# Patient Record
Sex: FEMALE | HISPANIC OR LATINO | Employment: OTHER | URBAN - METROPOLITAN AREA
[De-identification: names, ages, dates, MRNs, and addresses within clinical notes are randomized per-mention and may not be internally consistent; named-entity substitution may affect disease eponyms.]

---

## 2017-11-01 ENCOUNTER — OFFICE VISIT (OUTPATIENT)
Dept: OBSTETRICS AND GYNECOLOGY | Facility: CLINIC | Age: 67
End: 2017-11-01

## 2017-11-01 VITALS
BODY MASS INDEX: 31.82 KG/M2 | HEIGHT: 65 IN | DIASTOLIC BLOOD PRESSURE: 75 MMHG | HEART RATE: 92 BPM | WEIGHT: 191 LBS | SYSTOLIC BLOOD PRESSURE: 123 MMHG

## 2017-11-01 DIAGNOSIS — E11.9 CONTROLLED TYPE 2 DIABETES MELLITUS WITHOUT COMPLICATION, WITHOUT LONG-TERM CURRENT USE OF INSULIN (HCC): ICD-10-CM

## 2017-11-01 DIAGNOSIS — Z11.51 SCREENING FOR HPV (HUMAN PAPILLOMAVIRUS): ICD-10-CM

## 2017-11-01 DIAGNOSIS — Z12.4 SCREENING FOR CERVICAL CANCER: ICD-10-CM

## 2017-11-01 DIAGNOSIS — Z12.11 SCREENING FOR COLON CANCER: ICD-10-CM

## 2017-11-01 DIAGNOSIS — Z01.419 ENCOUNTER FOR GYNECOLOGICAL EXAMINATION WITHOUT ABNORMAL FINDING: Primary | ICD-10-CM

## 2017-11-01 PROCEDURE — G0101 CA SCREEN;PELVIC/BREAST EXAM: HCPCS | Performed by: OBSTETRICS & GYNECOLOGY

## 2017-11-01 RX ORDER — ATORVASTATIN CALCIUM 10 MG/1
10 TABLET, FILM COATED ORAL NIGHTLY
COMMUNITY
End: 2021-07-07

## 2017-11-01 RX ORDER — CETIRIZINE HYDROCHLORIDE 10 MG/1
10 TABLET ORAL DAILY
COMMUNITY

## 2017-11-01 RX ORDER — GLIPIZIDE 10 MG/1
10 TABLET ORAL
COMMUNITY

## 2017-11-01 RX ORDER — AMLODIPINE BESYLATE 10 MG/1
10 TABLET ORAL DAILY
COMMUNITY
End: 2021-07-07

## 2017-11-01 RX ORDER — LOSARTAN POTASSIUM 50 MG/1
50 TABLET ORAL DAILY
COMMUNITY
End: 2021-07-07

## 2017-11-01 RX ORDER — LANCETS 30 GAUGE
EACH MISCELLANEOUS
COMMUNITY
End: 2021-07-07

## 2017-11-01 RX ORDER — IBUPROFEN 800 MG/1
800 TABLET ORAL EVERY 6 HOURS PRN
COMMUNITY
End: 2021-07-07

## 2017-11-01 RX ORDER — SULFASALAZINE 500 MG/1
500 TABLET ORAL 4 TIMES DAILY
COMMUNITY
End: 2021-07-07

## 2017-11-01 NOTE — PROGRESS NOTES
GYN Annual Exam     CC- Here for annual exam.     Molly Marino is a 67 y.o. female who presents for annual well woman exam. Periods are absent due to Menopause.     OB History      Para Term  AB Living    2 2    2    SAB TAB Ectopic Multiple Live Births                  Current contraception: post menopausal status  History of abnormal Pap smear: no  Family history of uterine, colon or ovarian cancer: no  History of abnormal mammogram: no  Family history of breast cancer: no  Last Pap : 3 yrs ago  Last mammo: 2016  Last Dexa: 1-2 yrs ago  Last Colonoscopy: never     Past Medical History:   Diagnosis Date   • Diabetes mellitus    • Hyperlipidemia    • Hypertension    • Osteoarthritis    • Seasonal allergies        History reviewed. No pertinent surgical history.      Current Outpatient Prescriptions:   •  amLODIPine (NORVASC) 10 MG tablet, Take 10 mg by mouth Daily., Disp: , Rfl:   •  atorvastatin (LIPITOR) 10 MG tablet, Take 10 mg by mouth Every Night., Disp: , Rfl:   •  cetirizine (zyrTEC) 10 MG tablet, Take 10 mg by mouth Daily., Disp: , Rfl:   •  glipiZIDE (GLUCOTROL) 10 MG tablet, Take 10 mg by mouth 2 (Two) Times a Day Before Meals., Disp: , Rfl:   •  glucose blood test strip, 1 each by Other route As Needed. Use as instructed, Disp: , Rfl:   •  ibuprofen (ADVIL,MOTRIN) 800 MG tablet, Take 800 mg by mouth Every 6 (Six) Hours As Needed for Mild Pain ., Disp: , Rfl:   •  Lancets misc, , Disp: , Rfl:   •  losartan (COZAAR) 50 MG tablet, Take 50 mg by mouth Daily., Disp: , Rfl:   •  metFORMIN (GLUCOPHAGE) 500 MG tablet, Take 500 mg by mouth 2 (Two) Times a Day With Meals., Disp: , Rfl:   •  sulfaSALAzine (AZULFIDINE) 500 MG tablet, Take 500 mg by mouth 4 (Four) Times a Day., Disp: , Rfl:     No Known Allergies    Social History   Substance Use Topics   • Smoking status: Never Smoker   • Smokeless tobacco: Never Used   • Alcohol use No       Family History   Problem Relation Age of  "Onset   • Breast cancer Neg Hx    • Ovarian cancer Neg Hx    • Uterine cancer Neg Hx    • Colon cancer Neg Hx    • Deep vein thrombosis Neg Hx    • Pulmonary embolism Neg Hx        Review of Systems   Constitutional: Negative for chills and fever.   Gastrointestinal: Negative for abdominal pain.   Genitourinary: Negative for dysuria, pelvic pain, vaginal bleeding and vaginal discharge.   All other systems reviewed and are negative.      /75  Pulse 92  Ht 65\" (165.1 cm)  Wt 191 lb (86.6 kg)  Breastfeeding? No  BMI 31.78 kg/m2    Physical Exam   Constitutional: She is oriented to person, place, and time. She appears well-developed and well-nourished. No distress.   HENT:   Head: Normocephalic and atraumatic.   Eyes: Conjunctivae are normal.   Neck: Normal range of motion. Neck supple. No thyromegaly present.   Cardiovascular: Normal rate and regular rhythm.    No murmur heard.  Pulmonary/Chest: Effort normal and breath sounds normal. Right breast exhibits no inverted nipple, no mass and no nipple discharge. Left breast exhibits no inverted nipple, no mass and no nipple discharge.   Abdominal: Soft. Bowel sounds are normal. She exhibits no distension. There is no tenderness.   Genitourinary: Rectum normal, vagina normal and uterus normal. Rectal exam shows no mass and anal tone normal. Pelvic exam was performed with patient supine. There is no lesion on the right labia. There is no lesion on the left labia. Uterus is not enlarged, not fixed and not tender. Cervix exhibits no motion tenderness. Right adnexum displays no mass and no tenderness. Left adnexum displays no mass and no tenderness. No bleeding in the vagina. No vaginal discharge found.   Musculoskeletal: She exhibits no edema.   Lymphadenopathy:        Right: No inguinal adenopathy present.        Left: No inguinal adenopathy present.   Neurological: She is alert and oriented to person, place, and time.   Skin: No rash noted.   Psychiatric: She has " a normal mood and affect. Her behavior is normal.          Assessment     1) GYN annual well woman exam.   2) DM, controlled on medication follow up with PCP      Plan     1) Breast Health - Clinical breast exam & mammogram yearly, Self breast awareness monthly  2) Pap - updated today   3) Smoking status- non-smoker   4) Colon health - screening colonoscopy recommended   5) Bone health - Weight bearing exercise, dietary calcium recommendations and vitamin D reviewed.   6) Seat belts recommended  7) Follow up prn and two years      Misha Mcneil MD   11/1/2017  3:33 PM

## 2017-11-03 LAB
CYTOLOGIST CVX/VAG CYTO: NORMAL
CYTOLOGY CVX/VAG DOC THIN PREP: NORMAL
DX ICD CODE: NORMAL
DX ICD CODE: NORMAL
HIV 1 & 2 AB SER-IMP: NORMAL
HPV I/H RISK 1 DNA CVX QL PROBE+SIG AMP: NEGATIVE
OTHER STN SPEC: NORMAL
PATH REPORT.FINAL DX SPEC: NORMAL
STAT OF ADQ CVX/VAG CYTO-IMP: NORMAL

## 2017-11-28 ENCOUNTER — PREP FOR SURGERY (OUTPATIENT)
Dept: OTHER | Facility: HOSPITAL | Age: 67
End: 2017-11-28

## 2017-11-28 DIAGNOSIS — R19.4 CHANGE IN BOWEL HABITS: Primary | ICD-10-CM

## 2017-12-27 ENCOUNTER — OUTSIDE FACILITY SERVICE (OUTPATIENT)
Dept: GASTROENTEROLOGY | Facility: CLINIC | Age: 67
End: 2017-12-27

## 2017-12-27 PROCEDURE — G0121 COLON CA SCRN NOT HI RSK IND: HCPCS | Performed by: INTERNAL MEDICINE

## 2018-01-05 ENCOUNTER — TELEPHONE (OUTPATIENT)
Dept: GASTROENTEROLOGY | Facility: CLINIC | Age: 68
End: 2018-01-05

## 2018-01-05 NOTE — TELEPHONE ENCOUNTER
----- Message from José Aguilera MD sent at 1/5/2018  9:49 AM EST -----  Regarding: FW: Procedure  Colonoscopy recall 10 years        ----- Message -----     From: Tj Johnson     Sent: 1/3/2018   2:37 PM       To: José Aguilera MD  Subject: Procedure                                        Scanned in    Repeat colonoscopy placed on recall list.

## 2018-02-23 ENCOUNTER — PROCEDURE VISIT (OUTPATIENT)
Dept: OBSTETRICS AND GYNECOLOGY | Facility: CLINIC | Age: 68
End: 2018-02-23

## 2018-02-23 ENCOUNTER — APPOINTMENT (OUTPATIENT)
Dept: WOMENS IMAGING | Facility: HOSPITAL | Age: 68
End: 2018-02-23

## 2018-02-23 DIAGNOSIS — Z12.31 VISIT FOR SCREENING MAMMOGRAM: Primary | ICD-10-CM

## 2018-02-23 PROCEDURE — 77067 SCR MAMMO BI INCL CAD: CPT | Performed by: OBSTETRICS & GYNECOLOGY

## 2018-02-23 PROCEDURE — 77067 SCR MAMMO BI INCL CAD: CPT | Performed by: RADIOLOGY

## 2021-07-07 ENCOUNTER — OFFICE VISIT (OUTPATIENT)
Dept: OBSTETRICS AND GYNECOLOGY | Facility: CLINIC | Age: 71
End: 2021-07-07

## 2021-07-07 VITALS
HEIGHT: 65 IN | WEIGHT: 181 LBS | HEART RATE: 79 BPM | DIASTOLIC BLOOD PRESSURE: 66 MMHG | SYSTOLIC BLOOD PRESSURE: 113 MMHG | BODY MASS INDEX: 30.16 KG/M2

## 2021-07-07 DIAGNOSIS — N39.498 OTHER URINARY INCONTINENCE: ICD-10-CM

## 2021-07-07 DIAGNOSIS — N81.3 UTEROVAGINAL PROLAPSE, COMPLETE: ICD-10-CM

## 2021-07-07 DIAGNOSIS — Z01.411 ENCOUNTER FOR GYNECOLOGICAL EXAMINATION (GENERAL) (ROUTINE) WITH ABNORMAL FINDINGS: ICD-10-CM

## 2021-07-07 DIAGNOSIS — N95.0 PMB (POSTMENOPAUSAL BLEEDING): Primary | ICD-10-CM

## 2021-07-07 PROCEDURE — 99203 OFFICE O/P NEW LOW 30 MIN: CPT | Performed by: OBSTETRICS & GYNECOLOGY

## 2021-07-07 PROCEDURE — A4561 PESSARY RUBBER, ANY TYPE: HCPCS | Performed by: OBSTETRICS & GYNECOLOGY

## 2021-07-07 PROCEDURE — 57160 INSERT PESSARY/OTHER DEVICE: CPT | Performed by: OBSTETRICS & GYNECOLOGY

## 2021-07-07 PROCEDURE — 58100 BIOPSY OF UTERUS LINING: CPT | Performed by: OBSTETRICS & GYNECOLOGY

## 2021-07-07 RX ORDER — ATORVASTATIN CALCIUM 20 MG/1
TABLET, FILM COATED ORAL
COMMUNITY
Start: 2021-04-11

## 2021-07-07 RX ORDER — HYDROCHLOROTHIAZIDE 12.5 MG/1
12.5 CAPSULE, GELATIN COATED ORAL DAILY
COMMUNITY
Start: 2021-02-02 | End: 2021-08-01

## 2021-07-07 RX ORDER — AMLODIPINE BESYLATE 5 MG/1
TABLET ORAL
COMMUNITY
Start: 2021-04-11

## 2021-07-07 RX ORDER — FERROUS SULFATE 325(65) MG
1 TABLET ORAL 2 TIMES DAILY
COMMUNITY
Start: 2021-04-06

## 2021-07-07 RX ORDER — PSEUDOEPHED/ACETAMINOPH/DIPHEN 30MG-500MG
TABLET ORAL
COMMUNITY
Start: 2021-06-23

## 2021-07-07 RX ORDER — ALENDRONATE SODIUM 70 MG/1
TABLET ORAL
COMMUNITY
Start: 2021-07-01

## 2021-07-07 RX ORDER — FAMOTIDINE 20 MG/1
TABLET, FILM COATED ORAL
COMMUNITY
Start: 2021-04-11

## 2021-07-07 RX ORDER — LOSARTAN POTASSIUM 100 MG/1
TABLET ORAL
COMMUNITY
Start: 2021-04-11

## 2021-07-07 RX ORDER — METOPROLOL SUCCINATE 25 MG/1
TABLET, EXTENDED RELEASE ORAL
COMMUNITY
Start: 2021-04-11

## 2021-07-07 NOTE — PROGRESS NOTES
"Cc - vaginal bleeding     HPI     71 y.o.    Spotting started a few months ago - no pain   3 weeks ago started with heavier bleeding and associated pain   3 pads per day. No clots   Pain is lower abdominal - midline pain   Worse with movement.   Intermittent in nature.   Alleviated with tylenol   She has lost weight - average of about 2 # per week last few weeks.   Testing with PCP for UTI was negative       Past Medical History:   Diagnosis Date   • Diabetes mellitus (CMS/HCC)    • Hyperlipidemia    • Hypertension    • Osteoarthritis    • Osteopenia    • Seasonal allergies      No past surgical history on file.  Family History   Problem Relation Age of Onset   • Breast cancer Daughter    • Ovarian cancer Neg Hx    • Uterine cancer Neg Hx    • Colon cancer Neg Hx    • Deep vein thrombosis Neg Hx    • Pulmonary embolism Neg Hx      Social History     Tobacco Use   • Smoking status: Never Smoker   • Smokeless tobacco: Never Used   Substance Use Topics   • Alcohol use: No   • Drug use: No     meds   HCTZ  Fosamax  Norvasc  Liptor   Pepcid  Glucotrol  Cozaar   glucophage   toprol XL     Allergies:  Patient has no known allergies.    Review of Systems   Constitutional: Positive for fatigue and unexpected weight change. Negative for fever.   Gastrointestinal: Positive for abdominal pain and constipation. Negative for diarrhea.   Genitourinary: Positive for pelvic pain and vaginal bleeding. Negative for dysuria and hematuria.   All other systems reviewed and are negative.      /66   Pulse 79   Ht 165.1 cm (65\")   Wt 82.1 kg (181 lb)   Breastfeeding No   BMI 30.12 kg/m²     Physical Exam  Constitutional:       General: She is not in acute distress.     Appearance: She is well-developed and normal weight.   Genitourinary:      Pelvic exam was performed with patient supine.      Vulva, urethra, bladder, vagina, right adnexa, left adnexa and rectum normal.      No vulval lesion or Bartholin's cyst noted.      No " posterior fourchette lesion present.      No inguinal adenopathy present in the right or left side.     Vaginal atrophy and prolapse (complete inversion of the vagina present ) present.      No vaginal discharge or bleeding.      No cervical motion tenderness or friability.      Uterus is not enlarged or tender.      No uterine mass detected.     Uterus is anteverted and regular.      Right and left adnexa are non-palpable.   Rectum:      No rectal mass or abnormal anal tone.   HENT:      Head: Normocephalic and atraumatic.   Eyes:      Conjunctiva/sclera: Conjunctivae normal.      Pupils: Pupils are equal, round, and reactive to light.   Neck:      Thyroid: No thyromegaly.   Cardiovascular:      Rate and Rhythm: Normal rate and regular rhythm.      Heart sounds: Normal heart sounds. No murmur heard.     Pulmonary:      Effort: Pulmonary effort is normal. No respiratory distress.      Breath sounds: Normal breath sounds.   Chest:      Breasts:         Right: No inverted nipple, mass or nipple discharge.         Left: No inverted nipple, mass or nipple discharge.   Abdominal:      General: Abdomen is flat. There is no distension.      Palpations: Abdomen is soft.      Tenderness: There is no abdominal tenderness.   Musculoskeletal:         General: No deformity. Normal range of motion.      Cervical back: Normal range of motion and neck supple.   Lymphadenopathy:      Lower Body: No right inguinal adenopathy. No left inguinal adenopathy.   Neurological:      Mental Status: She is alert and oriented to person, place, and time.   Skin:     General: Skin is warm and dry.      Findings: No erythema.   Psychiatric:         Behavior: Behavior normal.   Vitals reviewed. Exam conducted with a chaperone present.        Endometrial Biopsy    Date/Time: 7/7/2021 1:29 PM  Performed by: Misha Mcneil MD  Authorized by: Misha Mcneil MD   Preparation: Patient was prepped and draped in the usual sterile  fashion.  Local anesthesia used: no    Anesthesia:  Local anesthesia used: no    Sedation:  Patient sedated: no    Patient tolerance: patient tolerated the procedure well with no immediate complications  Comments: Verbal consent obtained. Cervix was outside vaginal opening. Swabbed with betadine x 2. Pipelle passed to 11 cm with return of small amount of tissue. Sent for pathology. Tolerated well. No complications.             Assessment     Diagnoses and all orders for this visit:    1. PMB (postmenopausal bleeding) (Primary)  -     Reference Histopathology  -     US Non-ob Transvaginal  -     IGP, Rfx Aptima HPV ASCU    2. Uterovaginal prolapse, complete    3. Encounter for gynecological examination (general) (routine) with abnormal findings   -     IGP, Rfx Aptima HPV ASCU    4. Other urinary incontinence    Other orders  -     Endometrial Biopsy      1) PMB - new onset with spotting then heavier bleeding. Discussed concern for hyperplasia and cancer. Work up with GYN ultrasound as well as endometrial biopsy. On exam concerned this is from vaginal irritation and not a result of endometrial pathology but will confirm.     2) Complete uterovaginal prolapse.   New finding   Reviewed refer to urogyn vs pessary and wants to proceed with pessary   Discussed that a pessary was a long term vaginal device that acted to support the uterus, cervix, bladder and keep those from prolapsing through her vaginal opening. That it would recur if she removed the device immediately and that we like to see these patients back every 3 months or so for a pessary cleaning and evaluation of the vagina to ensure it is not causing any damage or other concerns.     Ring with support #5 placed  Tolerated well   Will order for long term management.         3) pap done with bleeding to rule out cancer.   Per medicare only code available to use as GYN exam     4) Urinary incontinence   See if better post pessary - could still need urogyn      Misha Mcneil MD  7/7/2021  13:28 EDT

## 2021-07-09 LAB
DX ICD CODE: NORMAL
PATH REPORT.FINAL DX SPEC: NORMAL
PATH REPORT.GROSS SPEC: NORMAL
PATH REPORT.RELEVANT HX SPEC: NORMAL
PATH REPORT.SITE OF ORIGIN SPEC: NORMAL
PATHOLOGIST NAME: NORMAL
PAYMENT PROCEDURE: NORMAL

## 2021-07-12 ENCOUNTER — TELEPHONE (OUTPATIENT)
Dept: OBSTETRICS AND GYNECOLOGY | Facility: CLINIC | Age: 71
End: 2021-07-12

## 2021-07-12 DIAGNOSIS — N81.3 UTEROVAGINAL PROLAPSE, COMPLETE: Primary | ICD-10-CM

## 2021-07-12 NOTE — TELEPHONE ENCOUNTER
ELDON-sharing with you.  I am working on the referral.  Let me know if you want me to call pt to come in today & what time.    Thanks,   Allie

## 2021-07-12 NOTE — TELEPHONE ENCOUNTER
Good Morning,   Patients daughter calling, she said that pessary fell out, and she was trying to check the status of the permanent device.  I dont see any notes, please advise

## 2021-07-12 NOTE — TELEPHONE ENCOUNTER
Patients daughter called back, she said her mother called her, because her device fell out, she has been in severe pain all weekend and unable to move or walk.  Would like to proceed with surgery.

## 2021-07-12 NOTE — TELEPHONE ENCOUNTER
Allie,     Check with Kylee- see if she can upsize her pessary by 1, I think that would be ring with support size #6, she can come in and we place slightly larger device while they wait. Going to need something why she gets in and undergoes treatment with Uro Gyn. FYI - new referral placed for this. I would suggest Dr. Alva or Marie     Check with kylee- okay if can come today to replace to get her out of pain. Bring in the one that fell out.     Thanks,   Dr. Mcneil

## 2021-07-13 LAB
CONV .: NORMAL
CYTOLOGIST CVX/VAG CYTO: NORMAL
CYTOLOGY CVX/VAG DOC CYTO: NORMAL
CYTOLOGY CVX/VAG DOC THIN PREP: NORMAL
DX ICD CODE: NORMAL
HIV 1 & 2 AB SER-IMP: NORMAL
OTHER STN SPEC: NORMAL
PATHOLOGIST CVX/VAG CYTO: NORMAL
STAT OF ADQ CVX/VAG CYTO-IMP: NORMAL

## 2021-07-13 NOTE — TELEPHONE ENCOUNTER
TONIA, pt scheduled for pessary maintenance/change tomorrow at [facility].  Wasn't sure if you have what you need here-incase you need to bring supplies.     Pt # [phone]      Referral and records faxed to [facility] for review.  They will call pt to schedule with Artie or Aiden.      [facility] # [phone]             Fax # [phone]

## 2021-07-14 ENCOUNTER — TELEPHONE (OUTPATIENT)
Dept: OBSTETRICS AND GYNECOLOGY | Facility: CLINIC | Age: 71
End: 2021-07-14

## 2021-07-14 ENCOUNTER — OFFICE VISIT (OUTPATIENT)
Dept: OBSTETRICS AND GYNECOLOGY | Facility: CLINIC | Age: 71
End: 2021-07-14

## 2021-07-14 VITALS
HEART RATE: 81 BPM | BODY MASS INDEX: 30.16 KG/M2 | DIASTOLIC BLOOD PRESSURE: 69 MMHG | WEIGHT: 181 LBS | SYSTOLIC BLOOD PRESSURE: 130 MMHG | HEIGHT: 65 IN

## 2021-07-14 DIAGNOSIS — N81.3 UTEROVAGINAL PROLAPSE, COMPLETE: Primary | ICD-10-CM

## 2021-07-14 DIAGNOSIS — N95.0 PMB (POSTMENOPAUSAL BLEEDING): ICD-10-CM

## 2021-07-14 PROCEDURE — 99213 OFFICE O/P EST LOW 20 MIN: CPT | Performed by: OBSTETRICS & GYNECOLOGY

## 2021-07-14 PROCEDURE — A4561 PESSARY RUBBER, ANY TYPE: HCPCS | Performed by: OBSTETRICS & GYNECOLOGY

## 2021-07-14 NOTE — TELEPHONE ENCOUNTER
----- Message from Kylee Avila MA sent at 7/12/2021  4:28 PM EDT -----  L/m for pt/brian  ----- Message -----  From: Misha Mcneil MD  Sent: 7/9/2021   1:15 PM EDT  To: NARINDER Wilson, her endometrial biopsy is benign and ultrasound is unremarkable. Please let them know. Finally find out if okay to just do pessary or wants to consider referral for uro gyn to surgically fix her prolapse. Thanks, Dr. Mcneil

## 2021-07-14 NOTE — PROGRESS NOTES
"Subjective    is a 71 y.o. female here for pessary sizing. Pessary ring with support size 5 fell out. She would like to try a size while waits for an appointment to see UroGyn.    Chief Complaint   Patient presents with   • Follow-up        HPI     71 y.o.   Seen last week with new onset PMB  Found to have complete uterine prolapse.   Pessary with support #5 placed  Came out one day later.   Called and wanted to see about something larger while awaits consult with uro gyn.   Referral placed in Epic on 7/12/21 when called about above.         Review of Systems   Gastrointestinal: Negative for abdominal pain.   Genitourinary: Positive for pelvic pain. Negative for vaginal bleeding.        Objective   /69   Pulse 81   Ht 165.1 cm (65\")   Wt 82.1 kg (181 lb)   BMI 30.12 kg/m²   Physical Exam  Constitutional:       General: She is not in acute distress.     Appearance: Normal appearance.   Genitourinary:      Vulva normal.      No vulval lesion or Bartholin's cyst noted.      Vaginal prolapse (cervix and vagina completely prolapsed through introitus ) present.   Abdominal:      General: Abdomen is flat. There is no distension.      Palpations: Abdomen is soft.      Tenderness: There is no abdominal tenderness.   Neurological:      Mental Status: She is alert.          Assessment/Plan   Diagnoses and all orders for this visit:    1. Uterovaginal prolapse, complete (Primary)    2. PMB (postmenopausal bleeding)    1) Uterovaginal prolapse  Used ring with support #6 to see if would work better for her. While she awaits evaluation and treatment with urogyn     The ring with support #6 was used from our personal supply of devices. So this was given to patient to use during this interval.     2) PMB  All testing is normal. EMBx, ultrasound and pap   So reassured this is related to above.     Misha Mcneil MD   7/14/2021  15:49 EDT  "

## 2021-07-18 ENCOUNTER — DOCUMENTATION (OUTPATIENT)
Dept: OBSTETRICS AND GYNECOLOGY | Facility: CLINIC | Age: 71
End: 2021-07-18

## 2021-07-18 NOTE — PROGRESS NOTES
Patient’s daughter Caridad is calling in wanting to get her mother’s surgery for uterine prolapse moved up because the new pessary has again fallen out and her mother has severe abdominal pain when she has prolapse. I discussed that we can try to get her referral to be more urgent tomorrow but if her pain is worsening she should go to the ED. I encouraged the patient to restrict movement so that her prolapse symptoms lessen. Her daughter indicated understanding and all questions answered.    Shawnee Palomino MD

## 2021-07-19 ENCOUNTER — TELEPHONE (OUTPATIENT)
Dept: OBSTETRICS AND GYNECOLOGY | Facility: CLINIC | Age: 71
End: 2021-07-19

## 2021-07-19 NOTE — TELEPHONE ENCOUNTER
----- Message from Shawnee Palomino MD sent at 7/19/2021 10:51 AM EDT -----  Regarding: Urgent referral  Alise Kelley,    Could you make this referral to urogyn urgently? The patient is very symptomatic and would like to get in as fast as possible. I discussed this with Tarun.     Thanks,    Samantha

## 2021-07-19 NOTE — TELEPHONE ENCOUNTER
"L/m with referral dept at  [facility] to f/u on referral that was faxed to their office on 7/13/2021. I re-faxed our referral with an \"URGENT REQUEST\".  I asked if I could go ahead and schedule for the patient, but they would not let me do that without being sure the records and referral have been reviewed.  I am still waiting for a return call.     [facility] [phone]    Pt # [phone]  "

## 2021-07-19 NOTE — TELEPHONE ENCOUNTER
L/m, via , for pt to call regarding referral to Urogyn.    Informed pt's daughter, Lesli on HIPAA, pt is scheduled at  [facility] w/Dr. Roel Alva this Wednesday, 7/21/21 at 8:00am (arrival time).  Lesli may accompany pt to appt.

## 2023-08-08 ENCOUNTER — OFFICE VISIT (OUTPATIENT)
Dept: OBSTETRICS AND GYNECOLOGY | Facility: CLINIC | Age: 73
End: 2023-08-08
Payer: MEDICARE

## 2023-08-08 ENCOUNTER — PROCEDURE VISIT (OUTPATIENT)
Dept: OBSTETRICS AND GYNECOLOGY | Facility: CLINIC | Age: 73
End: 2023-08-08
Payer: MEDICARE

## 2023-08-08 VITALS
SYSTOLIC BLOOD PRESSURE: 118 MMHG | HEART RATE: 71 BPM | DIASTOLIC BLOOD PRESSURE: 71 MMHG | WEIGHT: 175 LBS | HEIGHT: 65 IN | BODY MASS INDEX: 29.16 KG/M2

## 2023-08-08 DIAGNOSIS — Z01.419 ENCOUNTER FOR GYNECOLOGICAL EXAMINATION WITHOUT ABNORMAL FINDING: Primary | ICD-10-CM

## 2023-08-08 DIAGNOSIS — Z12.31 VISIT FOR SCREENING MAMMOGRAM: Primary | ICD-10-CM

## 2023-08-08 RX ORDER — ATORVASTATIN CALCIUM 10 MG/1
TABLET, FILM COATED ORAL
COMMUNITY
Start: 2023-07-19

## 2023-08-08 RX ORDER — AMLODIPINE BESYLATE 5 MG/1
1 TABLET ORAL DAILY
COMMUNITY
Start: 2023-07-19

## 2023-08-08 RX ORDER — ALENDRONATE SODIUM 70 MG/1
1 TABLET ORAL
COMMUNITY
Start: 2023-07-19

## 2023-08-08 RX ORDER — METOPROLOL SUCCINATE 25 MG/1
1 TABLET, EXTENDED RELEASE ORAL NIGHTLY
COMMUNITY
Start: 2023-07-19

## 2023-08-08 RX ORDER — GLIPIZIDE 5 MG/1
TABLET, FILM COATED, EXTENDED RELEASE ORAL
COMMUNITY
Start: 2023-07-19

## 2023-08-08 RX ORDER — LOSARTAN POTASSIUM AND HYDROCHLOROTHIAZIDE 12.5; 1 MG/1; MG/1
1 TABLET ORAL EVERY MORNING
COMMUNITY
Start: 2023-07-19

## 2023-08-08 RX ORDER — FAMOTIDINE 20 MG/1
1 TABLET, FILM COATED ORAL NIGHTLY
COMMUNITY
Start: 2023-07-19

## 2023-08-08 NOTE — PROGRESS NOTES
GYN Annual Exam     CC- Here for annual exam.     Molly Marino is a 73 y.o. female who presents for annual well woman exam. Periods are  absent due to Hysterectomy.     OB History          2    Para   2    Term                AB        Living   2         SAB        IAB        Ectopic        Molar        Multiple        Live Births                    Current contraception: Hysterectomy.   History of abnormal Pap smear: no  Family history of uterine, colon or ovarian cancer: no  History of abnormal mammogram: no  Family history of breast cancer: yes - Daughter   Last Pap : 2021 NIL   Last mammogram: today  Last colonoscopy:   Last DEXA: 2022 Osteopenia- taking Fosamax (primary managing the osteopenia) Recent stable DEXA in    Parental Hip Fracture: No     Past Medical History:   Diagnosis Date    Diabetes mellitus     Hyperlipidemia     Hypertension     Osteoarthritis     Osteopenia     Seasonal allergies        Past Surgical History:   Procedure Laterality Date    SACROCOLPOPEXY      VAGINAL HYSTERECTOMY           Current Outpatient Medications:     alendronate (FOSAMAX) 70 MG tablet, Take 1 tablet by mouth Every 7 (Seven) Days., Disp: , Rfl:     amLODIPine (NORVASC) 5 MG tablet, Take 1 tablet by mouth Daily., Disp: , Rfl:     atorvastatin (LIPITOR) 10 MG tablet, TAKE 1 TABLET ONE TIME DAILY IN THE EVENING, Disp: , Rfl:     famotidine (PEPCID) 20 MG tablet, Take 1 tablet by mouth Every Night., Disp: , Rfl:     glipizide (GLUCOTROL XL) 5 MG ER tablet, TAKE 1 TABLET ONE TIME DAILY IN THE MORNING. DO NOT CRUSH, SPLIT OR CHEW, Disp: , Rfl:     losartan-hydrochlorothiazide (HYZAAR) 100-12.5 MG per tablet, Take 1 tablet by mouth Every Morning., Disp: , Rfl:     metFORMIN (GLUCOPHAGE) 1000 MG tablet, Take 1 tablet by mouth 2 (Two) Times a Day., Disp: , Rfl:     metoprolol succinate XL (TOPROL-XL) 25 MG 24 hr tablet, Take 1 tablet by mouth Every Night., Disp: , Rfl:      "Acetaminophen Extra Strength 500 MG tablet, TAKE 2 CAPLETS BY MOUTH EVERY 6 HOURS AS NEEDED FOR MILD PAIN., Disp: , Rfl:     Calcium 600+D High Potency 600-400 MG-UNIT per tablet, Take 1 tablet by mouth 2 (Two) Times a Day., Disp: , Rfl:     cetirizine (zyrTEC) 10 MG tablet, Take 10 mg by mouth Daily., Disp: , Rfl:     FeroSul 325 (65 Fe) MG tablet, Take 1 tablet by mouth 2 (Two) Times a Day., Disp: , Rfl:     No Known Allergies    Social History     Tobacco Use    Smoking status: Never    Smokeless tobacco: Never   Substance Use Topics    Alcohol use: No    Drug use: No       Family History   Problem Relation Age of Onset    Breast cancer Daughter     Ovarian cancer Neg Hx     Uterine cancer Neg Hx     Colon cancer Neg Hx     Deep vein thrombosis Neg Hx     Pulmonary embolism Neg Hx        Review of Systems   Constitutional:  Negative for chills and fever.   Gastrointestinal:  Negative for abdominal pain, constipation and diarrhea.   Genitourinary:  Negative for pelvic pain, vaginal bleeding and vaginal discharge.   All other systems reviewed and are negative.    /71   Pulse 71   Ht 165.1 cm (65\")   Wt 79.4 kg (175 lb)   BMI 29.12 kg/mý     Physical Exam  Constitutional:       General: She is not in acute distress.     Appearance: She is well-developed. She is obese.   Genitourinary:      Vulva, bladder, rectum and urethral meatus normal.      Right Labia: No lesions or Bartholin's cyst.     Left Labia: No lesions or Bartholin's cyst.     No inguinal adenopathy present in the right or left side.     Vaginal cuff intact.     No vaginal discharge, bleeding or cuff induration.      No vaginal prolapse present.     Mild vaginal atrophy present.       Right Adnexa: not tender, not full and no mass present.     Left Adnexa: not tender, not full and no mass present.     Cervix is absent.      No parametrium thickening present.     Uterus is absent.   Rectum:      No rectal mass or abnormal anal tone. "   Breasts:     Right: No inverted nipple, mass or nipple discharge.      Left: No inverted nipple, mass or nipple discharge.   HENT:      Head: Normocephalic and atraumatic.   Eyes:      Conjunctiva/sclera: Conjunctivae normal.      Pupils: Pupils are equal, round, and reactive to light.   Neck:      Thyroid: No thyromegaly.   Cardiovascular:      Rate and Rhythm: Normal rate and regular rhythm.      Heart sounds: Normal heart sounds. No murmur heard.  Pulmonary:      Effort: Pulmonary effort is normal. No respiratory distress.      Breath sounds: Normal breath sounds.   Abdominal:      General: Abdomen is flat. There is no distension.      Palpations: Abdomen is soft.      Tenderness: There is no abdominal tenderness.   Musculoskeletal:         General: No deformity. Normal range of motion.      Cervical back: Normal range of motion and neck supple.   Lymphadenopathy:      Lower Body: No right inguinal adenopathy. No left inguinal adenopathy.   Neurological:      Mental Status: She is alert and oriented to person, place, and time.   Skin:     General: Skin is warm and dry.      Findings: No erythema.   Psychiatric:         Behavior: Behavior normal.   Vitals reviewed. Exam conducted with a chaperone present.           Assessment     Diagnoses and all orders for this visit:    1. GYN annual  (Primary)  -     IGP, Rfx Aptima HPV ASCU         Plan     1) Breast Health - Clinical breast exam & mammogram reviewed specifically [employer] recommendations for screening specific to her, and Self breast awareness monthly  Keeping up with yearly MMG  2) Pap - updated today   3) Smoking status- non-smoker   4) Colon health - screening colonoscopy up to date  5) Bone health - Weight bearing exercise, dietary calcium recommendations and vitamin D reviewed.   PCP following, on treatment - stable   6) Encouraged between 7-8 hours of good sleep per night.   7) Follow up prn and one year      Misha Mcneil MD    8/8/2023  10:56 EDT

## 2023-08-12 LAB
CONV .: NORMAL
CYTOLOGIST CVX/VAG CYTO: NORMAL
CYTOLOGY CVX/VAG DOC CYTO: NORMAL
CYTOLOGY CVX/VAG DOC THIN PREP: NORMAL
DX ICD CODE: NORMAL
HIV 1 & 2 AB SER-IMP: NORMAL
OTHER STN SPEC: NORMAL
STAT OF ADQ CVX/VAG CYTO-IMP: NORMAL

## 2024-08-20 ENCOUNTER — PROCEDURE VISIT (OUTPATIENT)
Dept: OBSTETRICS AND GYNECOLOGY | Facility: CLINIC | Age: 74
End: 2024-08-20
Payer: MEDICARE

## 2024-08-20 DIAGNOSIS — Z12.31 VISIT FOR SCREENING MAMMOGRAM: Primary | ICD-10-CM

## 2024-08-20 PROCEDURE — 77063 BREAST TOMOSYNTHESIS BI: CPT | Performed by: OBSTETRICS & GYNECOLOGY

## 2024-08-20 PROCEDURE — 77067 SCR MAMMO BI INCL CAD: CPT | Performed by: OBSTETRICS & GYNECOLOGY

## 2025-04-07 ENCOUNTER — APPOINTMENT (OUTPATIENT)
Dept: CT IMAGING | Facility: HOSPITAL | Age: 75
End: 2025-04-07
Payer: MEDICARE

## 2025-04-07 ENCOUNTER — HOSPITAL ENCOUNTER (EMERGENCY)
Facility: HOSPITAL | Age: 75
Discharge: HOME | End: 2025-04-08
Attending: STUDENT IN AN ORGANIZED HEALTH CARE EDUCATION/TRAINING PROGRAM | Admitting: STUDENT IN AN ORGANIZED HEALTH CARE EDUCATION/TRAINING PROGRAM
Payer: MEDICARE

## 2025-04-07 DIAGNOSIS — H60.391 ACUTE INFECTIVE OTITIS EXTERNA, RIGHT: ICD-10-CM

## 2025-04-07 DIAGNOSIS — H66.3X1 CHRONIC SUPPURATIVE OTITIS MEDIA OF RIGHT EAR, UNSPECIFIED OTITIS MEDIA LOCATION: Primary | ICD-10-CM

## 2025-04-07 LAB
ALBUMIN SERPL-MCNC: 4.6 G/DL (ref 3.5–5.2)
ALBUMIN/GLOB SERPL: 1.4 G/DL
ALP SERPL-CCNC: 107 U/L (ref 39–117)
ALT SERPL W P-5'-P-CCNC: 14 U/L (ref 1–33)
ANION GAP SERPL CALCULATED.3IONS-SCNC: 10.7 MMOL/L (ref 5–15)
AST SERPL-CCNC: 15 U/L (ref 1–32)
BASOPHILS # BLD AUTO: 0.03 10*3/MM3 (ref 0–0.2)
BASOPHILS NFR BLD AUTO: 0.4 % (ref 0–1.5)
BILIRUB SERPL-MCNC: 0.2 MG/DL (ref 0–1.2)
BUN SERPL-MCNC: 23 MG/DL (ref 8–23)
BUN/CREAT SERPL: 29.1 (ref 7–25)
CALCIUM SPEC-SCNC: 9.8 MG/DL (ref 8.6–10.5)
CHLORIDE SERPL-SCNC: 104 MMOL/L (ref 98–107)
CO2 SERPL-SCNC: 24.3 MMOL/L (ref 22–29)
CREAT SERPL-MCNC: 0.79 MG/DL (ref 0.57–1)
DEPRECATED RDW RBC AUTO: 43.4 FL (ref 37–54)
EGFRCR SERPLBLD CKD-EPI 2021: 78.6 ML/MIN/1.73
EOSINOPHIL # BLD AUTO: 0.07 10*3/MM3 (ref 0–0.4)
EOSINOPHIL NFR BLD AUTO: 1 % (ref 0.3–6.2)
ERYTHROCYTE [DISTWIDTH] IN BLOOD BY AUTOMATED COUNT: 13.1 % (ref 12.3–15.4)
GLOBULIN UR ELPH-MCNC: 3.3 GM/DL
GLUCOSE SERPL-MCNC: 142 MG/DL (ref 65–99)
HCT VFR BLD AUTO: 41.3 % (ref 34–46.6)
HGB BLD-MCNC: 12.9 G/DL (ref 12–15.9)
IMM GRANULOCYTES # BLD AUTO: 0 10*3/MM3 (ref 0–0.05)
IMM GRANULOCYTES NFR BLD AUTO: 0 % (ref 0–0.5)
LYMPHOCYTES # BLD AUTO: 2.11 10*3/MM3 (ref 0.7–3.1)
LYMPHOCYTES NFR BLD AUTO: 29.9 % (ref 19.6–45.3)
MCH RBC QN AUTO: 27.9 PG (ref 26.6–33)
MCHC RBC AUTO-ENTMCNC: 31.2 G/DL (ref 31.5–35.7)
MCV RBC AUTO: 89.4 FL (ref 79–97)
MONOCYTES # BLD AUTO: 0.42 10*3/MM3 (ref 0.1–0.9)
MONOCYTES NFR BLD AUTO: 5.9 % (ref 5–12)
NEUTROPHILS NFR BLD AUTO: 4.43 10*3/MM3 (ref 1.7–7)
NEUTROPHILS NFR BLD AUTO: 62.8 % (ref 42.7–76)
PLATELET # BLD AUTO: 294 10*3/MM3 (ref 140–450)
PMV BLD AUTO: 10.1 FL (ref 6–12)
POTASSIUM SERPL-SCNC: 3.9 MMOL/L (ref 3.5–5.2)
PROT SERPL-MCNC: 7.9 G/DL (ref 6–8.5)
RBC # BLD AUTO: 4.62 10*6/MM3 (ref 3.77–5.28)
SODIUM SERPL-SCNC: 139 MMOL/L (ref 136–145)
WBC NRBC COR # BLD AUTO: 7.06 10*3/MM3 (ref 3.4–10.8)

## 2025-04-07 PROCEDURE — 70450 CT HEAD/BRAIN W/O DYE: CPT

## 2025-04-07 PROCEDURE — 85652 RBC SED RATE AUTOMATED: CPT | Performed by: STUDENT IN AN ORGANIZED HEALTH CARE EDUCATION/TRAINING PROGRAM

## 2025-04-07 PROCEDURE — 99285 EMERGENCY DEPT VISIT HI MDM: CPT

## 2025-04-07 PROCEDURE — 25010000002 MORPHINE PER 10 MG: Performed by: STUDENT IN AN ORGANIZED HEALTH CARE EDUCATION/TRAINING PROGRAM

## 2025-04-07 PROCEDURE — 80053 COMPREHEN METABOLIC PANEL: CPT | Performed by: STUDENT IN AN ORGANIZED HEALTH CARE EDUCATION/TRAINING PROGRAM

## 2025-04-07 PROCEDURE — 70487 CT MAXILLOFACIAL W/DYE: CPT

## 2025-04-07 PROCEDURE — 96374 THER/PROPH/DIAG INJ IV PUSH: CPT

## 2025-04-07 PROCEDURE — 85025 COMPLETE CBC W/AUTO DIFF WBC: CPT | Performed by: STUDENT IN AN ORGANIZED HEALTH CARE EDUCATION/TRAINING PROGRAM

## 2025-04-07 RX ORDER — MORPHINE SULFATE 2 MG/ML
2 INJECTION, SOLUTION INTRAMUSCULAR; INTRAVENOUS ONCE
Status: COMPLETED | OUTPATIENT
Start: 2025-04-07 | End: 2025-04-07

## 2025-04-07 RX ADMIN — MORPHINE SULFATE 2 MG: 2 INJECTION, SOLUTION INTRAMUSCULAR; INTRAVENOUS at 23:08

## 2025-04-08 VITALS
HEART RATE: 74 BPM | HEIGHT: 62 IN | TEMPERATURE: 97.4 F | WEIGHT: 169 LBS | RESPIRATION RATE: 18 BRPM | DIASTOLIC BLOOD PRESSURE: 71 MMHG | BODY MASS INDEX: 31.1 KG/M2 | OXYGEN SATURATION: 100 % | SYSTOLIC BLOOD PRESSURE: 133 MMHG

## 2025-04-08 LAB — ERYTHROCYTE [SEDIMENTATION RATE] IN BLOOD: 44 MM/HR (ref 0–30)

## 2025-04-08 PROCEDURE — 25510000001 IOPAMIDOL PER 1 ML: Performed by: STUDENT IN AN ORGANIZED HEALTH CARE EDUCATION/TRAINING PROGRAM

## 2025-04-08 RX ORDER — IOPAMIDOL 755 MG/ML
100 INJECTION, SOLUTION INTRAVASCULAR
Status: COMPLETED | OUTPATIENT
Start: 2025-04-08 | End: 2025-04-08

## 2025-04-08 RX ORDER — OXYCODONE HYDROCHLORIDE 5 MG/1
5 TABLET ORAL ONCE
Refills: 0 | Status: COMPLETED | OUTPATIENT
Start: 2025-04-08 | End: 2025-04-08

## 2025-04-08 RX ORDER — CIPROFLOXACIN AND DEXAMETHASONE 3; 1 MG/ML; MG/ML
4 SUSPENSION/ DROPS AURICULAR (OTIC) 2 TIMES DAILY
Qty: 7.5 ML | Refills: 0 | Status: SHIPPED | OUTPATIENT
Start: 2025-04-08 | End: 2025-04-15

## 2025-04-08 RX ORDER — AMOXICILLIN 500 MG/1
500 CAPSULE ORAL 3 TIMES DAILY
Qty: 21 CAPSULE | Refills: 0 | Status: SHIPPED | OUTPATIENT
Start: 2025-04-08 | End: 2025-04-15

## 2025-04-08 RX ORDER — NAPROXEN SODIUM 550 MG/1
550 TABLET ORAL 2 TIMES DAILY WITH MEALS
Qty: 20 TABLET | Refills: 0 | Status: SHIPPED | OUTPATIENT
Start: 2025-04-08 | End: 2025-04-18

## 2025-04-08 RX ADMIN — IOPAMIDOL 85 ML: 755 INJECTION, SOLUTION INTRAVENOUS at 00:09

## 2025-04-08 RX ADMIN — OXYCODONE HYDROCHLORIDE 5 MG: 5 TABLET ORAL at 02:06

## 2025-04-08 NOTE — [facility] PROVIDER NOTE
Subjective   History of Present Illness  History obtained via patient w/ bilingual daughter at bedside    Outside records reviewed include ENT documentation from NP Jaelyn Antoine on 1/20's 11/2025.  Patient was therefore persistent right otorrhea and otalgia.  Chronic asymmetric hearing loss.  Patient has multiple MRIs of IAC    MDM/HPI:  75-year-old female presents emergency department for evaluation of right-sided ear pain.  Has been ongoing chronically for several months now.  Patient follows with ENT as documented above.  However, over the past week, her symptoms have become worse.  She is a dull aching sensation of the right ear that radiates to the surrounding tissues.  Often causes her to experience a headache.  It fluctuates.  Has noticed some drainage coming out of the ear as well.    Gen: NAD  HEENT: left ear canal clear. Right ear w/ yellow otorrhea and ear canal erythema. Post ear tender. PERRLA, EOMI, 2040 vision bilaterally  CV: RRR, S1S2, No murmur, No edema  Pulm: CTAB, Unlabored respirations  Abd: Soft, NT/ND  MSK: No deformities  Neuro: A+Ox3, GCS 15, Moving all extremities spontaneously    On arrival to the emergency department, vital signs are afebrile and hemodynamically stable.  On physical exam, patient is in no acute distress, but does have otorrhea and ear canal erythema w/ some post ear tenderness on right.  Differential diagnosis includes otorrhea, AOM, AOE, mastoiditis.             Review of Systems    Past Medical History:   Diagnosis Date    Diabetes mellitus     Hyperlipidemia     Hypertension     Osteoarthritis     Osteopenia     Seasonal allergies        No Known Allergies    Past Surgical History:   Procedure Laterality Date    SACROCOLPOPEXY      VAGINAL HYSTERECTOMY         Family History   Problem Relation Age of Onset    Breast cancer Daughter     Ovarian cancer Neg Hx     Uterine cancer Neg Hx     Colon cancer Neg Hx     Deep vein thrombosis Neg Hx     Pulmonary embolism Neg Hx         Social History     Socioeconomic History    Marital status: Single   Tobacco Use    Smoking status: Never    Smokeless tobacco: Never   Substance and Sexual Activity    Alcohol use: No    Drug use: No    Sexual activity: Not Currently           Objective   Physical Exam    Procedures           ED Course                                           Medical Decision Making  Labs viewed and interpreted contemporaneously by me and are unremarkable for emergent abnormalities including leukocytosis    Imaging viewed and interpreted contemporaneously by me and are unremarkable for emergent abnormalities including intracranial hemorrhage on head CT. Per radiologist, no evidence of mastoiditis    Administered morphine and oxycodone. Ear pain improved    Prescribed ciprodex and amoxicillin and anaprox    Follow up with ENT    Overall impression is acute on chronic otorrhea w/ otitis externa and media      Patient stable on reassessment.  Vital signs afebrile and hemodynamically stable.  Pain is well controlled. Patient is able to ambulate and tolerate PO intake without difficulty.  Low concern for any additional acute or life-threatening conditions requiring further work-up or emergent intervention at this time.  Laboratory work-up and imaging findings including normal labs and ct discussed with the patient who is able to articulate and affirmed their understanding, and feels comfortable with discharge at this time. Plan on prescribing patient with the above medications, and discussed the risks and benefits of these prescriptions and the schedule for their proper administration. Post discharge follow-up appointments including with ent also discussed with patient, who indicated that they would schedule and attend these follow-up appointments.  Strict return precautions discussed prior to discharge including the importance of returning to the ED with any new or worsening symptoms. Patient agreeable to plan as stated above  without any further questions or concerns.      Problems Addressed:  Acute infective otitis externa, right: complicated acute illness or injury  Chronic suppurative otitis media of right ear, unspecified otitis media location: complicated acute illness or injury    Amount and/or Complexity of Data Reviewed  Labs: ordered.  Radiology: ordered.    Risk  Prescription drug management.        Final diagnoses:   None       ED Disposition  ED Disposition       None            No follow-up provider specified.       Medication List      No changes were made to your prescriptions during this visit.